# Patient Record
Sex: FEMALE | Race: WHITE | Employment: OTHER | URBAN - METROPOLITAN AREA
[De-identification: names, ages, dates, MRNs, and addresses within clinical notes are randomized per-mention and may not be internally consistent; named-entity substitution may affect disease eponyms.]

---

## 2017-09-26 ENCOUNTER — HOSPITAL ENCOUNTER (OUTPATIENT)
Dept: LAB | Age: 67
Discharge: HOME | End: 2017-09-26
Payer: MEDICARE

## 2017-09-26 ENCOUNTER — OFFICE VISIT (OUTPATIENT)
Dept: GYNECOLOGY | Age: 67
End: 2017-09-26

## 2017-09-26 VITALS
SYSTOLIC BLOOD PRESSURE: 151 MMHG | HEART RATE: 72 BPM | WEIGHT: 293 LBS | DIASTOLIC BLOOD PRESSURE: 85 MMHG | BODY MASS INDEX: 41.95 KG/M2 | HEIGHT: 70 IN

## 2017-09-26 DIAGNOSIS — Z85.42 HISTORY OF ENDOMETRIAL CANCER: Primary | ICD-10-CM

## 2017-09-26 DIAGNOSIS — Z91.89 GYN EXAM FOR HIGH-RISK MEDICARE PATIENT: ICD-10-CM

## 2017-09-26 PROCEDURE — 88142 CYTOPATH C/V THIN LAYER: CPT | Performed by: OBSTETRICS & GYNECOLOGY

## 2017-09-26 RX ORDER — ALLOPURINOL 100 MG/1
100 TABLET ORAL
COMMUNITY

## 2017-09-26 NOTE — PROGRESS NOTES
One year check up, pt reports no abnormal spotting or bleeding, pt states she has no questions or concerns for today's visit, Patient states she is no longer taking the following medications: Premarin, Plavix, HCTZ and Protonix, Initial blood pressure reading 158/94, repeat blood pressure reading 151/85

## 2017-09-26 NOTE — PROGRESS NOTES
[facility] [address], [address]  [phone]  MD Brittanie Gamble MD    Patient ID:  Michelle Magallanes  [id number]  1950/66 y.o. Visit date: 9/26/2017    INTERVAL HISTORY: Michelle Magallanes is a  female with a history of endometrial cancer. Last cytology 8/2015, 7/2014   Negative    CT: 4/2012   FINDINGS:    ABDOMEN: The lung bases show right middle lobe infiltrate. . Liver and spleen   parenchyma are homogeneous. The pancreas and adrenal glands are normal. The   kidneys excrete contrast symmetrically bilaterally. A single nonobstructing   calculus appears in the left upper renal pole. Bowel loops are nondilated   and there is no ascites. PELVIS: Additional evaluation of the pelvis reveals no abnormal mass or   fluid collection. The uterus is surgically absent, with no abnormal soft   tissue mass in the hysterectomy bed. Surgical clips also noted in the   adnexal regions bilaterally. The urinary bladder is normally distended. The   distal ureters are normal. The appendix is normal.      IMPRESSION:  1. Right middle lobe infiltrate. 2. No mechanical obstruction of bowel. 3. Single nonobstructing left intrarenal calculus. 4. Normal appendix. 5. Status post hysterectomy. Imaging history: Mammogram current, followup scheduled for a suspicious reading. Chemotherapy history: None  Last Cytology: 8/2016   Negative    9/26/2017:  She is being seen today for annual followup. Asymptomatic, active, no restrictions. Active, no restrictions. Negative  and GI review. Negative cardiopulmonary review. Patient denies any abnormal bleeding or vaginal discharge. Weight stable. Recent Cardiology evaluation, cath negative.     OB/GYN ROS: Denies, dysuria, hematuria, urinary incontinence, vaginal discharge, abnormal vaginal bleeding, pelvic pain    Past Medical History:   Diagnosis Date    [facility] [address]     [facility]    right breast, no evidence of malignancy    Hypertension     Liver disease     hepatitis    Liver disease     fatty liver       Past Surgical History:   Procedure Laterality Date    HX COLONOSCOPY      HX ORTHOPAEDIC      left knee    HX ORTHOPAEDIC      right shoulder    HX OTHER SURGICAL      kiney stone removal       Social History     Social History    Marital status:      Spouse name: N/A    Number of children: N/A    Years of education: N/A     Occupational History    Not on file. Social History Main Topics    Smoking status: Never Smoker    Smokeless tobacco: Never Used    Alcohol use No    Drug use: No    Sexual activity: Not Currently     Other Topics Concern    Not on file     Social History Narrative       Family History   Problem Relation Age of Onset    Hypertension Other        Current Outpatient Prescriptions on File Prior to Visit   Medication Sig Dispense Refill    isosorbide mononitrate ER (IMDUR) 30 mg tablet TK 1 T PO QAM  4    esomeprazole (NEXIUM) 20 mg capsule Take  by mouth daily.  metoprolol succinate (TOPROL XL) 100 mg XL tablet Take 150 mg by mouth daily.  benazepril (LOTENSIN) 40 mg tablet Take 40 mg by mouth daily.  aspirin 81 mg tablet Take 81 mg by mouth daily.  clopidogrel (PLAVIX) 75 mg tablet TK 1 T PO  D  3    NITROSTAT 0.4 mg SL tablet   5    pantoprazole (PROTONIX) 40 mg tablet       estrogens, conjugated, (PREMARIN) 0.45 mg tablet Take 1 Tab by mouth daily. 90 Tab 3    hydrochlorothiazide (HYDRODIURIL) 25 mg tablet Take 25 mg by mouth daily. Indications: HYPERTENSION       No current facility-administered medications on file prior to visit.         Allergies   Allergen Reactions    Pcn [Penicillins] Rash       ROS:  Negative    OBJECTIVE:  PHYSICAL EXAM  VITAL SIGNS: Visit Vitals    /85 (BP 1 Location: Left arm, BP Patient Position: Sitting)    Pulse 72    Ht 5' 10\" (1.778 m)    Wt 297 lb (134.7 kg)    BMI 42.62 kg/m2      GENERAL CHANDRA: well developed and well nourished   HEENT: within normal limits, node negative. No thyroid nodularity. RESPIRATORY: lungs clear to auscultation, breath sounds equal and symmetric   CARDIOVASC: Regular rate and rhythm or S1S2 present   GASTROINT: soft, non-tender, without masses or organomegaly   MUSCULOSKEL: no joint tenderness, deformity or swelling   INTEGUMENT:    EXTREMITIES: extremities normal, atraumatic, no cyanosis or edema   PELVIC: External genitalia: normal general appearance, BUS negative  Vaginal: normal without tenderness, induration or masses and normal rugae       Cytology taken  Adnexa: normal bimanual exam, non palpable and removed surgically. PSW clear. Smooth induration over the pelvis. RECTAL: rectal exam not indicated   DERIC SURVEY: Cervical, supraclavicular, and axillary nodes normal.   NEURO: Grossly normal     DATE REVIEW as available:  Lab Results   Component Value Date/Time    WBC 11.3 04/11/2012 11:55 PM    HGB 13.4 04/11/2012 11:55 PM    HCT 39.6 04/11/2012 11:55 PM    PLATELET 093 87/32/0779 11:55 PM    MCV 91.0 04/11/2012 11:55 PM     Lab Results   Component Value Date/Time    Sodium 137 04/11/2012 11:55 PM    Potassium 2.8 04/11/2012 11:55 PM    Chloride 99 04/11/2012 11:55 PM    CO2 31 04/11/2012 11:55 PM    Anion gap 7 04/11/2012 11:55 PM    Glucose 141 04/11/2012 11:55 PM    BUN 15 04/11/2012 11:55 PM    Creatinine 0.9 04/11/2012 11:55 PM    BUN/Creatinine ratio 17 04/11/2012 11:55 PM    GFR est AA >60 04/11/2012 11:55 PM    GFR est non-AA >60 04/11/2012 11:55 PM    Calcium 9.6 04/11/2012 11:55 PM       IMPRESSION AND PLAN:    Debra Severe has a working diagnosis of endometrial cancer, DEEPALI  Cytology taken without friability    Return annually or PRN symptoms, pending normal cytology.       Russ Hernandez MD  9/26/2017/2:35 PM

## 2017-10-04 NOTE — PROGRESS NOTES
Patient:   Murray Boyle  SSN: [id number]  : 1950    Date:    10/4/2017    Ms. Dutch Rome's cytology/Pap smear has been interpreted as within normal limts. I would ask that subsequent Pap smears be performed at the interval discussed at the last office visit.     If there are any questions please do not hesitate to contact our offices ([phone])    Edna Gracia MD

## 2018-12-31 ENCOUNTER — TELEPHONE (OUTPATIENT)
Dept: GYNECOLOGY | Age: 68
End: 2018-12-31

## 2018-12-31 DIAGNOSIS — Z12.31 ENCOUNTER FOR SCREENING MAMMOGRAM FOR MALIGNANT NEOPLASM OF BREAST: Primary | ICD-10-CM

## 2018-12-31 NOTE — TELEPHONE ENCOUNTER
Pt  and states she has an appt with Dr. Mahnaz Thornton on 1/16/19 and needs an order for her yearly screening mammogram.  She states she typically get them done at [facility].   Pt would like order mailed to her address, address on file verified, order placed in mail today

## 2019-01-16 ENCOUNTER — OFFICE VISIT (OUTPATIENT)
Dept: GYNECOLOGY | Age: 69
End: 2019-01-16

## 2019-01-16 VITALS
SYSTOLIC BLOOD PRESSURE: 140 MMHG | BODY MASS INDEX: 41.95 KG/M2 | WEIGHT: 293 LBS | HEIGHT: 70 IN | DIASTOLIC BLOOD PRESSURE: 70 MMHG | HEART RATE: 75 BPM

## 2019-01-16 DIAGNOSIS — E78.00 HYPERCHOLESTEREMIA: ICD-10-CM

## 2019-01-16 DIAGNOSIS — C54.1 ENDOMETRIAL CANCER (HCC): Primary | ICD-10-CM

## 2019-01-16 DIAGNOSIS — E66.01 OBESITY, MORBID (HCC): ICD-10-CM

## 2019-01-16 RX ORDER — HYDRALAZINE HYDROCHLORIDE 25 MG/1
25 TABLET, FILM COATED ORAL 3 TIMES DAILY
COMMUNITY
Start: 2019-01-09

## 2019-01-16 NOTE — PROGRESS NOTES
[address], [address] [facility], [address] 
[phone] Office Note Patient ID: 
Ventura Phalen [id number] 
1950/68 y.o. Visit date: 1/16/2019 INTERVAL HISTORY: Ventura Phalen Ms. Ventura Phalen is a 70 y.o.  female with a remote history of endometrial cancer s/p staging laparotomy in 1993. Denies all complaints today. Denies vaginal bleeding/discharge, change in appetite or bowel habits, change in weight, abdominal/pelvic pain, or urinary symptoms. OB/GYN ROS: Denies, dysuria, hematuria, urinary incontinence, vaginal discharge, abnormal vaginal bleeding, pelvic pain Past Medical History:  
Diagnosis Date  Cancer ([facility] [address]   
 utriene  H/O diagnostic mammography 3/4/16  
 right breast, no evidence of malignancy  Hypertension  Liver disease   
 hepatitis  Liver disease   
 fatty liver Past Surgical History:  
Procedure Laterality Date  HX COLONOSCOPY    
 HX ORTHOPAEDIC    
 left knee  HX ORTHOPAEDIC    
 right shoulder  HX OTHER SURGICAL    
 kiney stone removal  
 
 
Social History Socioeconomic History  Marital status:  Spouse name: Not on file  Number of children: Not on file  Years of education: Not on file  Highest education level: Not on file Social Needs  Financial resource strain: Not on file  Food insecurity - worry: Not on file  Food insecurity - inability: Not on file  Transportation needs - medical: Not on file  Transportation needs - non-medical: Not on file Occupational History  Not on file Tobacco Use  Smoking status: Never Smoker  Smokeless tobacco: Never Used Substance and Sexual Activity  Alcohol use: No  
 Drug use: No  
 Sexual activity: Not Currently Other Topics Concern  Not on file Social History Narrative  Not on file Family History Problem Relation Age of Onset  Hypertension Other Current Outpatient Medications on File Prior to Visit Medication Sig Dispense Refill  hydrALAZINE (APRESOLINE) 25 mg tablet  cholecalciferol, vitamin D3, (VITAMIN D3 PO) Take  by mouth.  allopurinol (ZYLOPRIM) 100 mg tablet Take 100 mg by mouth.  isosorbide mononitrate ER (IMDUR) 30 mg tablet TK 1 T PO QAM  4  
 metoprolol succinate (TOPROL XL) 100 mg XL tablet Take 150 mg by mouth daily.  benazepril (LOTENSIN) 40 mg tablet Take 40 mg by mouth daily.  aspirin 81 mg tablet Take 81 mg by mouth daily.  clopidogrel (PLAVIX) 75 mg tablet TK 1 T PO  D  3  
 NITROSTAT 0.4 mg SL tablet   5  pantoprazole (PROTONIX) 40 mg tablet  esomeprazole (NEXIUM) 20 mg capsule Take  by mouth daily.  estrogens, conjugated, (PREMARIN) 0.45 mg tablet Take 1 Tab by mouth daily. 90 Tab 3  
 hydrochlorothiazide (HYDRODIURIL) 25 mg tablet Take 25 mg by mouth daily. Indications: HYPERTENSION No current facility-administered medications on file prior to visit. Allergies Allergen Reactions  Pcn [Penicillins] Rash ROS:  
Review of Systems - History obtained from the patient General ROS: negative for - chills, fatigue, fever, weight gain or weight loss Psychological ROS: negative for - anxiety or depression ENT ROS: negative for - headaches, sore throat or visual changes Breast ROS: negative for breast lumps Respiratory ROS: no cough, shortness of breath, or wheezing Cardiovascular ROS: no chest pain or dyspnea on exertion Gastrointestinal ROS: no abdominal pain, change in bowel habits, or black or bloody stools Genito-Urinary ROS: no dysuria, trouble voiding, or hematuria Musculoskeletal ROS: negative for - gait disturbance, joint pain, joint stiffness, muscle pain or muscular weakness Neurological ROS: negative for - confusion, dizziness, headaches, memory loss, numbness/tingling, seizures, speech problems or weakness Dermatological ROS: negative for - pruritus, rash or skin lesion changes OBJECTIVE: 
Physical Exam: 
Visit Vitals /70 (BP 1 Location: Left arm, BP Patient Position: Sitting) Pulse 75 Ht 5' 10\" (1.778 m) Wt 299 lb 9.6 oz (135.9 kg) BMI 42.99 kg/m² General: Alert and oriented. No acute distress. Well-nourished HEENT: No thyroid enlargment. Neck supple without restrictions. Sclera normal. Normal occular motion. Moist mucous membranes. Lymphatics: No evidence of axillary, cervical, or subclavicular adenopathy. Respiratory: clear to auscultation and percussion to the bases. No CVAT. Cardiovascular: regular rate and rhythm. No murmurs, rubs, or gallops. Gastrointestinal: soft, non-tender, non-distended, no masses or organomegaly. Well-healed incision. Musculoskeletal: normal gait. No joint tenderness, deformity or swelling. No muscular tenderness. Extremities: extremities normal, atraumatic, no cyanosis or edema. Pelvic: exam chaperoned by nurse. Normal appearing external genitalia. On speculum exam, the vagina is atrophic. The uterus and cervix are surgically absent. No evidence of masses or nodularity on bimanual exam. Deferred rectovaginal exam.  
Neuro: Grossly intact. Normal gait and movement. No acute deficit Skin: No evidence of rashes or skin changes. IMPRESSION AND PLAN: 
Ms. Ventura Phalen is a 70 y.o. female with a remote history of endometrial cancer s/p staging laparotomy in 1993. Problem List Items Addressed This Visit Reproductive Endometrial cancer ([facility] [address] - Primary Other Hypercholesteremia Obesity, morbid ([facility] [address] Reviewed patient's course to date. DEEPALI on exam today. Reassured patient. Annual mammogram scheduled the end of this month. Reviewed precautionary symptoms to return sooner. All questions and concerns were addressed with the patient and she is comfortable with the plan.   
 
Lisa Kelley MD

## 2019-01-16 NOTE — PROGRESS NOTES
One year check up, pt reports no abnormal spotting or bleeding, pt states she has no questions or concerns for today's visit 1. Have you been to the ER, urgent care clinic since your last visit? Hospitalized since your last visit?  no 
 
2. Have you seen or consulted any other health care providers outside of the [address] [place] [address] since your last visit? Include any pap smears or colon screening.    no

## 2019-08-12 ENCOUNTER — ANESTHESIA EVENT (OUTPATIENT)
Dept: ENDOSCOPY | Age: 69
End: 2019-08-12
Payer: MEDICARE

## 2019-08-12 ENCOUNTER — ANESTHESIA (OUTPATIENT)
Dept: ENDOSCOPY | Age: 69
End: 2019-08-12
Payer: MEDICARE

## 2019-08-12 ENCOUNTER — HOSPITAL ENCOUNTER (OUTPATIENT)
Age: 69
Setting detail: OUTPATIENT SURGERY
Discharge: HOME | End: 2019-08-12
Attending: COLON & RECTAL SURGERY | Admitting: COLON & RECTAL SURGERY
Payer: MEDICARE

## 2019-08-12 VITALS
RESPIRATION RATE: 20 BRPM | OXYGEN SATURATION: 97 % | WEIGHT: 293 LBS | BODY MASS INDEX: 41.02 KG/M2 | TEMPERATURE: 97.9 F | DIASTOLIC BLOOD PRESSURE: 72 MMHG | HEART RATE: 65 BPM | SYSTOLIC BLOOD PRESSURE: 130 MMHG | HEIGHT: 71 IN

## 2019-08-12 PROCEDURE — 76060000031 HC ANESTHESIA FIRST 0.5 HR: Performed by: COLON & RECTAL SURGERY

## 2019-08-12 PROCEDURE — 76040000019: Performed by: COLON & RECTAL SURGERY

## 2019-08-12 PROCEDURE — 74011250636 HC RX REV CODE- 250/636: Performed by: NURSE ANESTHETIST, CERTIFIED REGISTERED

## 2019-08-12 RX ORDER — NALOXONE HYDROCHLORIDE 0.4 MG/ML
0.4 INJECTION, SOLUTION INTRAMUSCULAR; INTRAVENOUS; SUBCUTANEOUS
Status: DISCONTINUED | OUTPATIENT
Start: 2019-08-12 | End: 2019-08-12 | Stop reason: HOSPADM

## 2019-08-12 RX ORDER — SODIUM CHLORIDE 0.9 % (FLUSH) 0.9 %
5-40 SYRINGE (ML) INJECTION AS NEEDED
Status: DISCONTINUED | OUTPATIENT
Start: 2019-08-12 | End: 2019-08-12 | Stop reason: HOSPADM

## 2019-08-12 RX ORDER — PROPOFOL 10 MG/ML
INJECTION, EMULSION INTRAVENOUS
Status: DISCONTINUED | OUTPATIENT
Start: 2019-08-12 | End: 2019-08-12 | Stop reason: HOSPADM

## 2019-08-12 RX ORDER — DEXTROMETHORPHAN/PSEUDOEPHED 2.5-7.5/.8
1.2 DROPS ORAL
Status: DISCONTINUED | OUTPATIENT
Start: 2019-08-12 | End: 2019-08-12 | Stop reason: HOSPADM

## 2019-08-12 RX ORDER — FLUMAZENIL 0.1 MG/ML
0.2 INJECTION INTRAVENOUS
Status: DISCONTINUED | OUTPATIENT
Start: 2019-08-12 | End: 2019-08-12 | Stop reason: HOSPADM

## 2019-08-12 RX ORDER — ATROPINE SULFATE 0.1 MG/ML
0.5 INJECTION INTRAVENOUS
Status: DISCONTINUED | OUTPATIENT
Start: 2019-08-12 | End: 2019-08-12 | Stop reason: HOSPADM

## 2019-08-12 RX ORDER — PROPOFOL 10 MG/ML
INJECTION, EMULSION INTRAVENOUS AS NEEDED
Status: DISCONTINUED | OUTPATIENT
Start: 2019-08-12 | End: 2019-08-12 | Stop reason: HOSPADM

## 2019-08-12 RX ORDER — SODIUM CHLORIDE 0.9 % (FLUSH) 0.9 %
5-40 SYRINGE (ML) INJECTION EVERY 8 HOURS
Status: DISCONTINUED | OUTPATIENT
Start: 2019-08-12 | End: 2019-08-12 | Stop reason: HOSPADM

## 2019-08-12 RX ADMIN — PROPOFOL 30 MG: 10 INJECTION, EMULSION INTRAVENOUS at 09:55

## 2019-08-12 RX ADMIN — PROPOFOL 100 MCG/KG/MIN: 10 INJECTION, EMULSION INTRAVENOUS at 09:54

## 2019-08-12 RX ADMIN — PROPOFOL 50 MG: 10 INJECTION, EMULSION INTRAVENOUS at 09:54

## 2019-08-12 RX ADMIN — PROPOFOL 20 MG: 10 INJECTION, EMULSION INTRAVENOUS at 09:56

## 2019-08-12 NOTE — ANESTHESIA POSTPROCEDURE EVALUATION
Procedure(s):  COLONOSCOPY. MAC    Anesthesia Post Evaluation      Multimodal analgesia: multimodal analgesia used between 6 hours prior to anesthesia start to PACU discharge  Patient location during evaluation: bedside  Patient participation: complete - patient participated  Level of consciousness: awake  Pain management: adequate  Airway patency: patent  Anesthetic complications: no  Cardiovascular status: acceptable  Respiratory status: acceptable  Hydration status: acceptable        Vitals Value Taken Time   /72 8/12/2019 10:29 AM   Temp 36.6 °C (97.9 °F) 8/12/2019 10:18 AM   Pulse 65 8/12/2019 10:34 AM   Resp 21 8/12/2019 10:34 AM   SpO2 96 % 8/12/2019 10:34 AM   Vitals shown include unvalidated device data.

## 2019-08-12 NOTE — DISCHARGE INSTRUCTIONS
Abram Brown  [id number]  1950    COLON DISCHARGE INSTRUCTIONS  Discomfort:  Redness at IV site- apply warm compress to area; if redness or soreness persist- contact your physician  There may be a slight amount of blood passed from the rectum  Gaseous discomfort- walking, belching will help relieve any discomfort  You may not operate a vehicle for 12 hours  You may not engage in an occupation involving machinery or appliances for rest of today  You may not drink alcoholic beverages for at least 12 hours  Avoid making any critical decisions for at least 24 hour  DIET:   High fiber diet. - however -  remember your colon is empty and a heavy meal will produce gas. Avoid these foods:  vegetables, fried / greasy foods, carbonated drinks for today    MEDICATIONS:           ACTIVITY:  You may resume your normal daily activities it is recommended that you spend the remainder of the day resting -  avoid any strenuous activity. CALL M.D. ANY SIGN OF:   Increasing pain, nausea, vomiting  Abdominal distension (swelling)  New increased bleeding (oral or rectal)  Fever (chills)  Pain in chest area  Bloody discharge from nose or mouth  Shortness of breath     Follow-up Instructions:   Call Ishmael Luna MD if any questions or problems. Telephone # [phone]  Should have a repeat colonoscopy in 5 years. COLONOSCOPY FINDINGS:  Your colonoscopy showed: diverticulosis.

## 2019-08-12 NOTE — PROCEDURES
[facility]   [facility] Brief Procedure Note    Deangelo Collier  [birth year]  [id number]    Date of Procedure: 8/12/2019    Preoperative diagnosis: SCREENING    Postoperative diagnosis: Diverticulosis.      Procedure: Procedure(s):  COLONOSCOPY    :  Dr. Arcelia Rios MD    Assistant(s): Endoscopy Technician-1: Princess Kaba  Endoscopy RN-1: Kobi Tinsley RN    EBL:None    Anesthesia/Sedation: Moderate sedation / MAC    Specimens: * No specimens in log *    Findings: diverticulosis    Complications: None    Dr. Arcelia Rios MD  8/12/2019  10:12 AM

## 2019-08-12 NOTE — PERIOP NOTES
[id number]  Anesthesia staff at patient's bedside administering anesthesia and monitoring patients vital signs throughout procedure. See anesthesia note. 1010  Endoscope was pre-cleaned at bedside immediately following procedure by Chadd Cuevas.    1012  Patient tolerated procedure without problems. Abdomen soft and patient arousable and voices no complaints Report received from CRNA, see anesthesia note. Patient transported to endoscopy recovery area. Report given to Felix Elizabeth RN.

## 2019-08-12 NOTE — ROUTINE PROCESS
Raisa Mederos 1950 
[id number] Situation: 
Verbal report received from: [facility] Procedure: Procedure(s): 
COLONOSCOPY Background: 
 
Preoperative diagnosis: SCREENING Postoperative diagnosis: Diverticulosis. :  Dr. Valerie Lazo Assistant(s): Endoscopy Technician-1: Adore Ashraf 
Endoscopy RN-1: Nathalia Coe RN Specimens: * No specimens in log * H. Pylori  no Assessment: 
Intra-procedure medications Anesthesia gave intra-procedure sedation and medications, see anesthesia flow sheet yes Intravenous fluids: NS@ Mykel Sinha Vital signs stable Abdominal assessment: round and soft Recommendation: 
Discharge patient per MD order. Family or Friend Permission to share finding with family or friend yes

## 2019-08-12 NOTE — H&P
Gastroenterology Outpatient History and Physical    Patient: Carol Sidhu    Physician: Genesis Lanier MD    Vital Signs: Blood pressure 156/84, pulse 82, temperature 97.8 °F (36.6 °C), resp. rate 21, height 5' 10.5\" (1.791 m), weight 136.8 kg (301 lb 9.4 oz), SpO2 100 %, not currently breastfeeding. Allergies: Allergies   Allergen Reactions    Pcn [Penicillins] Rash       Chief Complaint: screening colonoscopy    History of Present Illness: as above    Justification for Procedure: as above    History:  Past Medical History:   Diagnosis Date    Cancer ([facility] [address] 1993    uterine, complete hysterectomy, no radiation or chemo    Gout     H/O diagnostic mammography 3/4/16    right breast, no evidence of malignancy    Hypertension     Liver disease 1961    hepatitis,     Liver disease     fatty liver      Past Surgical History:   Procedure Laterality Date    HX COLONOSCOPY      HX ORTHOPAEDIC Left 2009    left knee replacement    HX ORTHOPAEDIC Right 2005    right shoulder partial replacement    HX OTHER SURGICAL      kiney stone removal      Social History     Socioeconomic History    Marital status:      Spouse name: Not on file    Number of children: Not on file    Years of education: Not on file    Highest education level: Not on file   Tobacco Use    Smoking status: Never Smoker    Smokeless tobacco: Never Used   Substance and Sexual Activity    Alcohol use: No    Drug use: No    Sexual activity: Not Currently      Family History   Problem Relation Age of Onset    Hypertension Other        Medications:   Prior to Admission medications    Medication Sig Start Date End Date Taking? Authorizing Provider   hydrALAZINE (APRESOLINE) 25 mg tablet 25 mg three (3) times daily. Indications: high blood pressure 1/9/19  Yes Provider, Historical   metoprolol succinate (TOPROL XL) 100 mg XL tablet Take 150 mg by mouth daily.    Yes Provider, Historical   benazepril (LOTENSIN) 40 mg tablet Take 40 mg by mouth daily. Yes Other, MD Demetrius   cholecalciferol, vitamin D3, (VITAMIN D3 PO) Take  by mouth. Provider, Historical   allopurinol (ZYLOPRIM) 100 mg tablet Take 100 mg by mouth. Provider, Historical   NITROSTAT 0.4 mg SL tablet 0.4 mg by SubLINGual route as needed. 5/25/16   Provider, Historical   aspirin 81 mg tablet Take 81 mg by mouth daily. Other, MD Demetrius       Physical Exam:   General: alert, no distress   HEENT: Head: Normocephalic, no lesions, without obvious abnormality.    Heart: regular rate and rhythm, S1, S2 normal, no murmur, click, rub or gallop   Lungs: chest clear, no wheezing, rales, normal symmetric air entry   Abdominal: Bowel sounds are normal, liver is not enlarged, spleen is not enlarged   Neurological: Grossly normal   Extremities: extremities normal, atraumatic, no cyanosis or edema     Findings/Diagnosis: a above    Plan of Care/Planned Procedure: colonoscopy

## 2019-08-12 NOTE — ANESTHESIA PREPROCEDURE EVALUATION
Relevant Problems   No relevant active problems       Anesthetic History   No history of anesthetic complications            Review of Systems / Medical History  Patient summary reviewed and pertinent labs reviewed    Pulmonary  Within defined limits                 Neuro/Psych   Within defined limits           Cardiovascular    Hypertension: well controlled              Exercise tolerance: >4 METS     GI/Hepatic/Renal             Pertinent negatives: Liver disease: fatty liver.    Endo/Other        Morbid obesity and cancer (h/o uterine cancer)     Other Findings   Comments: Gout           Physical Exam    Airway  Mallampati: II  TM Distance: 4 - 6 cm  Neck ROM: normal range of motion   Mouth opening: Normal     Cardiovascular    Rhythm: regular  Rate: normal         Dental    Dentition: Upper dentition intact, Lower dentition intact and Caps/crowns     Pulmonary  Breath sounds clear to auscultation               Abdominal         Other Findings            Anesthetic Plan    ASA: 2  Anesthesia type: MAC          Induction: Intravenous  Anesthetic plan and risks discussed with: Patient

## 2019-08-13 NOTE — PROCEDURES
Gael Cordero [facility]  PROCEDURE NOTE    Name:  Angela Snider  MR#:  [id number]  :  1950  ACCOUNT #:  [de-identified]  DATE OF SERVICE:  2019    PREOPERATIVE DIAGNOSIS:  Screening, history of uterine cancer. POSTOPERATIVE DIAGNOSIS:  Diverticulosis. PROCEDURE PERFORMED:  Colonoscopy. SURGEON:  Felix Ramirez MD    ASSISTANT:  None. ANESTHESIA:  MAC.    ESTIMATED BLOOD LOSS:  None. SPECIMENS REMOVED:  None. COMPLICATIONS:  None. IMPLANTS:  None. INDICATIONS:  The patient is 70years old. She has a history of uterine cancer and comes for screening colonoscopy. PROCEDURE:  After mechanical bowel prep, after intravenous sedation with 290 mg of propofol, the Olympus scope was introduced into the rectum and advanced under direct visualization to the cecum. The ileocecal valve and cecal cap were well visualized. Good bowel prep. The scope was slowly withdrawn. The mucosa was inspected. No polyps or neoplasia were seen. She did have moderate diverticulosis at the sigmoid colon without complication. Rectum is normal.  Air was aspirated. The patient tolerated the procedure well. IMPRESSION:  Diverticulosis, otherwise normal.    PLAN:  Recommend colonoscopy in five years.       Lois Abbott MD      [id number]  D:  2019 10:27  T:  2019 16:49  JOB #:  [id number]  CC:  MD Adriane Siddiqui MD

## 2020-02-26 ENCOUNTER — OFFICE VISIT (OUTPATIENT)
Dept: GYNECOLOGY | Age: 70
End: 2020-02-26

## 2020-02-26 VITALS
SYSTOLIC BLOOD PRESSURE: 155 MMHG | HEART RATE: 69 BPM | WEIGHT: 293 LBS | DIASTOLIC BLOOD PRESSURE: 85 MMHG | HEIGHT: 71 IN | BODY MASS INDEX: 41.02 KG/M2

## 2020-02-26 DIAGNOSIS — C54.1 ENDOMETRIAL CANCER (HCC): Primary | ICD-10-CM

## 2020-02-26 DIAGNOSIS — E66.01 OBESITY, MORBID (HCC): ICD-10-CM

## 2020-02-26 RX ORDER — ISOSORBIDE MONONITRATE 30 MG/1
TABLET, EXTENDED RELEASE ORAL
COMMUNITY
Start: 2020-01-18

## 2020-02-26 NOTE — PROGRESS NOTES
[address] [facility] [address], [address] [facility]  [phone] [facility] [phone]    Office Note    Patient ID:  Linda Castañeda  [id number]  1950/69 y.o. Visit date: 2/26/2020    INTERVAL HISTORY: Linda Castañeda is a 78 y.o.  female with a remote history of endometrial cancer s/p staging laparotomy in 1993. Denies all complaints today. Denies vaginal bleeding/discharge, change in appetite or bowel habits, change in weight, abdominal/pelvic pain, or urinary symptoms.        OB/GYN ROS: Denies, dysuria, hematuria, urinary incontinence, vaginal discharge, abnormal vaginal bleeding, pelvic pain    Past Medical History:   Diagnosis Date    Cancer [facility]) 1993    uterine, complete hysterectomy, no radiation or chemo    Gout     H/O diagnostic mammography 3/4/16    right breast, no evidence of malignancy    Hypertension     Liver disease 1961    hepatitis,     Liver disease     fatty liver       Past Surgical History:   Procedure Laterality Date    COLONOSCOPY N/A 8/12/2019    COLONOSCOPY performed by Ilana Meehan MD at [facility] HX COLONOSCOPY      HX ORTHOPAEDIC Left 2009    left knee replacement    HX ORTHOPAEDIC Right 2005    right shoulder partial replacement    HX OTHER SURGICAL      kiney stone removal       Social History     Socioeconomic History    Marital status:      Spouse name: Not on file    Number of children: Not on file    Years of education: Not on file    Highest education level: Not on file   Occupational History    Not on file   Social Needs    Financial resource strain: Not on file    Food insecurity:     Worry: Not on file     Inability: Not on file    Transportation needs:     Medical: Not on file     Non-medical: Not on file   Tobacco Use    Smoking status: Never Smoker    Smokeless tobacco: Never Used   Substance and Sexual Activity    Alcohol use: No    Drug use: No    Sexual activity: Not Currently   Lifestyle    Physical activity:     Days per week: Not on file     Minutes per session: Not on file    Stress: Not on file   Relationships    Social connections:     Talks on phone: Not on file     Gets together: Not on file     Attends Protestant service: Not on file     Active member of club or organization: Not on file     Attends meetings of clubs or organizations: Not on file     Relationship status: Not on file    Intimate partner violence:     Fear of current or ex partner: Not on file     Emotionally abused: Not on file     Physically abused: Not on file     Forced sexual activity: Not on file   Other Topics Concern    Not on file   Social History Narrative    Not on file       Family History   Problem Relation Age of Onset    Hypertension Other        Current Outpatient Medications on File Prior to Visit   Medication Sig Dispense Refill    hydrALAZINE (APRESOLINE) 25 mg tablet 25 mg three (3) times daily. Indications: high blood pressure      cholecalciferol, vitamin D3, (VITAMIN D3 PO) Take  by mouth.  allopurinol (ZYLOPRIM) 100 mg tablet Take 100 mg by mouth.  metoprolol succinate (TOPROL XL) 100 mg XL tablet Take 150 mg by mouth daily.  benazepril (LOTENSIN) 40 mg tablet Take 40 mg by mouth daily.  aspirin 81 mg tablet Take 81 mg by mouth daily.  isosorbide mononitrate ER (IMDUR) 30 mg tablet       NITROSTAT 0.4 mg SL tablet 0.4 mg by SubLINGual route as needed. 5     No current facility-administered medications on file prior to visit.         Allergies   Allergen Reactions    Pcn [Penicillins] Rash       ROS:   Review of Systems - History obtained from the patient  General ROS: negative for - chills, fatigue, fever, weight gain or weight loss  Psychological ROS: negative for - anxiety or depression  ENT ROS: negative for - headaches, sore throat or visual changes  Breast ROS: negative for breast lumps  Respiratory ROS: no cough, shortness of breath, or wheezing  Cardiovascular ROS: no chest pain or dyspnea on exertion  Gastrointestinal ROS: no abdominal pain, change in bowel habits, or black or bloody stools  Genito-Urinary ROS: no dysuria, trouble voiding, or hematuria  Musculoskeletal ROS: negative for - gait disturbance, joint pain, joint stiffness, muscle pain or muscular weakness  Neurological ROS: negative for - confusion, dizziness, headaches, memory loss, numbness/tingling, seizures, speech problems or weakness  Dermatological ROS: negative for - pruritus, rash or skin lesion changes      OBJECTIVE:  Physical Exam:  Visit Vitals  /85 (BP 1 Location: Left arm, BP Patient Position: Sitting)   Pulse 69   Ht 5' 10.5\" (1.791 m)   Wt 305 lb 9.6 oz (138.6 kg)   BMI 43.23 kg/m²      General: Alert and oriented. No acute distress. Well-nourished  HEENT: No thyroid enlargment. Neck supple without restrictions. Sclera normal. Normal occular motion. Moist mucous membranes. Lymphatics: No evidence of axillary, cervical, or subclavicular adenopathy. Respiratory: clear to auscultation and percussion to the bases. No CVAT. Cardiovascular: regular rate and rhythm. No murmurs, rubs, or gallops. Gastrointestinal: soft, non-tender, non-distended, no masses or organomegaly. Well-healed incision. Musculoskeletal: normal gait. No joint tenderness, deformity or swelling. No muscular tenderness. Extremities: extremities normal, atraumatic, no cyanosis or edema. Pelvic: exam chaperoned by nurse. Normal appearing external genitalia. On speculum exam, the vagina is atrophic. The uterus and cervix are surgically absent. No evidence of masses or nodularity on bimanual exam. Deferred rectovaginal exam.   Neuro: Grossly intact. Normal gait and movement. No acute deficit  Skin: No evidence of rashes or skin changes.         IMPRESSION AND PLAN:  Ms. Wally Chao is a 78 y.o. female with a remote history of endometrial cancer s/p staging laparotomy in 1993.     Problem List Items Addressed This Visit        Reproductive    Endometrial cancer ([facility] [address] - Primary       Other    Obesity, morbid ([facility] [address]        Reviewed patient's course to date. DEEPALI on exam today. Reassured patient. Annual mammogram scheduled in 2 weeks. RTC in 1 year for continued surveillance. Reviewed precautionary symptoms to return sooner. All questions and concerns were addressed with the patient and she is comfortable with the plan.      Narcisa Pabon MD

## 2020-02-26 NOTE — PROGRESS NOTES
One year follow up for endometrial cancer, pt reports no abnormal spotting or bleeding, pt states she has no questions or concerns for today's visit    Vitals:    02/26/20 1030 02/26/20 1034   BP: (!) 171/94 155/85   BP 1 Location: Left arm Left arm   BP Patient Position: Sitting Sitting   Pulse: 82 69   Weight: 305 lb 9.6 oz (138.6 kg)    Height: 5' 10.5\" (1.791 m)            1. Have you been to the ER, urgent care clinic since your last visit? Hospitalized since your last visit?  no    2. Have you seen or consulted any other health care providers outside of the [address] [place] [address] since your last visit? Include any pap smears or colon screening.    no

## 2021-05-05 ENCOUNTER — OFFICE VISIT (OUTPATIENT)
Dept: GYNECOLOGY | Age: 71
End: 2021-05-05
Payer: MEDICARE

## 2021-05-05 VITALS
HEIGHT: 71 IN | SYSTOLIC BLOOD PRESSURE: 161 MMHG | DIASTOLIC BLOOD PRESSURE: 101 MMHG | HEART RATE: 83 BPM | BODY MASS INDEX: 41.02 KG/M2 | WEIGHT: 293 LBS

## 2021-05-05 DIAGNOSIS — C54.1 ENDOMETRIAL CANCER (HCC): Primary | ICD-10-CM

## 2021-05-05 PROCEDURE — G8427 DOCREV CUR MEDS BY ELIG CLIN: HCPCS | Performed by: OBSTETRICS & GYNECOLOGY

## 2021-05-05 PROCEDURE — G8417 CALC BMI ABV UP PARAM F/U: HCPCS | Performed by: OBSTETRICS & GYNECOLOGY

## 2021-05-05 PROCEDURE — G8536 NO DOC ELDER MAL SCRN: HCPCS | Performed by: OBSTETRICS & GYNECOLOGY

## 2021-05-05 PROCEDURE — 3017F COLORECTAL CA SCREEN DOC REV: CPT | Performed by: OBSTETRICS & GYNECOLOGY

## 2021-05-05 PROCEDURE — G0463 HOSPITAL OUTPT CLINIC VISIT: HCPCS | Performed by: OBSTETRICS & GYNECOLOGY

## 2021-05-05 PROCEDURE — 1090F PRES/ABSN URINE INCON ASSESS: CPT | Performed by: OBSTETRICS & GYNECOLOGY

## 2021-05-05 PROCEDURE — 1101F PT FALLS ASSESS-DOCD LE1/YR: CPT | Performed by: OBSTETRICS & GYNECOLOGY

## 2021-05-05 PROCEDURE — G8400 PT W/DXA NO RESULTS DOC: HCPCS | Performed by: OBSTETRICS & GYNECOLOGY

## 2021-05-05 PROCEDURE — G8753 SYS BP > OR = 140: HCPCS | Performed by: OBSTETRICS & GYNECOLOGY

## 2021-05-05 PROCEDURE — G8755 DIAS BP > OR = 90: HCPCS | Performed by: OBSTETRICS & GYNECOLOGY

## 2021-05-05 PROCEDURE — G8432 DEP SCR NOT DOC, RNG: HCPCS | Performed by: OBSTETRICS & GYNECOLOGY

## 2021-05-05 PROCEDURE — 99213 OFFICE O/P EST LOW 20 MIN: CPT | Performed by: OBSTETRICS & GYNECOLOGY

## 2021-05-05 RX ORDER — VALSARTAN 320 MG/1
TABLET ORAL
COMMUNITY
Start: 2021-05-04

## 2021-05-05 NOTE — PROGRESS NOTES
One year follow up for endometrial cancer, pt reports no abnormal spotting or bleeding, pt states she has no questions or concerns for today's visit    1. Have you been to the ER, urgent care clinic since your last visit? Hospitalized since your last visit?  no    2. Have you seen or consulted any other health care providers outside of the [address] [place] [address] since your last visit? Include any pap smears or colon screening.    no

## 2021-05-05 NOTE — PROGRESS NOTES
27 [facility] [address], [address] [facility]  [phone] [facility] [phone]    Office Note    Patient ID:  Jet Trevino  [id number]  1950/70 y.o. Visit date: 5/5/2021    INTERVAL HISTORY: Jet Trevino is a 69 y.o.  female, an established patient,  with a remote history of endometrial cancer s/p staging laparotomy in 1993. Denies all complaints today. Denies vaginal bleeding/discharge, change in appetite or bowel habits, change in weight, abdominal/pelvic pain, or urinary symptoms. OB/GYN ROS: Denies, dysuria, hematuria, urinary incontinence, vaginal discharge, abnormal vaginal bleeding, pelvic pain    Past Medical History:   Diagnosis Date    Cancer [facility]) 1993    uterine, complete hysterectomy, no radiation or chemo    Gout     H/O diagnostic mammography 3/4/16    right breast, no evidence of malignancy    Hypertension     Liver disease 1961    hepatitis,     Liver disease     fatty liver       Past Surgical History:   Procedure Laterality Date    COLONOSCOPY N/A 8/12/2019    COL[facility] performed by Monica Tena MD at [address] [facility] HX COLONOSCOPY      HX ORTHOPAEDIC Left 2009    left knee replacement    HX ORTHOPAEDIC Right 2005    right shoulder partial replacement    HX OTHER SURGICAL      kiney stone removal       Social History     Socioeconomic History    Marital status:      Spouse name: Not on file    Number of children: Not on file    Years of education: Not on file    Highest education level: Not on file   Occupational History    Not on file   Social Needs    Financial resource strain: Not on file    Food insecurity     Worry: Not on file     Inability: Not on file    Transportation needs     Medical: Not on file     Non-medical: Not on file   Tobacco Use    Smoking status: Never Smoker    Smokeless tobacco: Never Used   Substance and Sexual Activity    Alcohol use:  No  Drug use: No    Sexual activity: Not Currently   Lifestyle    Physical activity     Days per week: Not on file     Minutes per session: Not on file    Stress: Not on file   Relationships    Social connections     Talks on phone: Not on file     Gets together: Not on file     Attends Gnosticist service: Not on file     Active member of club or organization: Not on file     Attends meetings of clubs or organizations: Not on file     Relationship status: Not on file    Intimate partner violence     Fear of current or ex partner: Not on file     Emotionally abused: Not on file     Physically abused: Not on file     Forced sexual activity: Not on file   Other Topics Concern    Not on file   Social History Narrative    Not on file       Family History   Problem Relation Age of Onset    Hypertension Other        Current Outpatient Medications on File Prior to Visit   Medication Sig Dispense Refill    valsartan (DIOVAN) 320 mg tablet       isosorbide mononitrate ER (IMDUR) 30 mg tablet       hydrALAZINE (APRESOLINE) 25 mg tablet 25 mg three (3) times daily. Indications: high blood pressure      cholecalciferol, vitamin D3, (VITAMIN D3 PO) Take  by mouth.  allopurinol (ZYLOPRIM) 100 mg tablet Take 100 mg by mouth.  metoprolol succinate (TOPROL XL) 100 mg XL tablet Take 150 mg by mouth daily.  aspirin 81 mg tablet Take 81 mg by mouth daily.  NITROSTAT 0.4 mg SL tablet 0.4 mg by SubLINGual route as needed. 5    benazepril (LOTENSIN) 40 mg tablet Take 40 mg by mouth daily. No current facility-administered medications on file prior to visit.         Allergies   Allergen Reactions    Benazepril Other (comments)     Throat swelliing    Pcn [Penicillins] Rash       ROS:   Review of Systems - History obtained from the patient  General ROS: negative for - chills, fatigue, fever, weight gain or weight loss  Psychological ROS: negative for - anxiety or depression  ENT ROS: negative for - headaches, sore throat or visual changes  Breast ROS: negative for breast lumps  Respiratory ROS: no cough, shortness of breath, or wheezing  Cardiovascular ROS: no chest pain or dyspnea on exertion  Gastrointestinal ROS: no abdominal pain, change in bowel habits, or black or bloody stools  Genito-Urinary ROS: no dysuria, trouble voiding, or hematuria  Musculoskeletal ROS: negative for - gait disturbance, joint pain, joint stiffness, muscle pain or muscular weakness  Neurological ROS: negative for - confusion, dizziness, headaches, memory loss, numbness/tingling, seizures, speech problems or weakness  Dermatological ROS: negative for - pruritus, rash or skin lesion changes      OBJECTIVE:  Physical Exam:  Visit Vitals  BP (!) 161/101 (BP 1 Location: Left lower arm, BP Patient Position: Sitting, BP Cuff Size: Adult)   Pulse 83   Ht 5' 10.5\" (1.791 m)   Wt 302 lb 3.2 oz (137.1 kg)   BMI 42.75 kg/m²      General: Alert and oriented. No acute distress. Well-nourished  HEENT: No thyroid enlargment. Neck supple without restrictions. Sclera normal. Normal occular motion. Moist mucous membranes. Lymphatics: No evidence of axillary, cervical, or subclavicular adenopathy. Respiratory: clear to auscultation and percussion to the bases. No CVAT. Cardiovascular: regular rate and rhythm. No murmurs, rubs, or gallops. Gastrointestinal: soft, non-tender, non-distended, no masses or organomegaly. Well-healed incision. Musculoskeletal: normal gait. No joint tenderness, deformity or swelling. No muscular tenderness. Extremities: extremities normal, atraumatic, no cyanosis or edema. Pelvic: exam chaperoned by nurse. Normal appearing external genitalia. On speculum exam, the vagina is atrophic. The uterus and cervix are surgically absent. No evidence of masses or nodularity on bimanual exam. Deferred rectovaginal exam.   Neuro: Grossly intact. Normal gait and movement.  No acute deficit  Skin: No evidence of rashes or skin changes. IMPRESSION AND PLAN:  Ms. Ely Escobar is a 69 y.o. female with a remote history of endometrial cancer s/p staging laparotomy in 1993. Patient Active Problem List    Diagnosis Date Noted    Obesity, morbid ([facility] [address] 01/16/2019    Obesity 07/22/2014    Hypercholesteremia 07/22/2014    Endometrial cancer ([facility] [address] 05/30/2012    Abdominal pain 04/12/2012    Unspecified essential hypertension 04/12/2012    Hypokalemia 04/12/2012     Reviewed patient's course to date. DEEPALI on exam today. Reassured patient. RTC in 1 year for continued surveillance. Reviewed precautionary symptoms to return sooner. All questions and concerns were addressed with the patient and she is comfortable with the plan. An electronic signature was used to authenticate this note.      Caprice Gómez MD

## 2022-03-19 PROBLEM — E66.01 OBESITY, MORBID (HCC): Status: ACTIVE | Noted: 2019-01-16

## 2022-05-16 NOTE — PROGRESS NOTES
One year follow up for endometrial cancer, pt reports no abnormal spotting or bleeding, pt states she has no questions or concerns for today's visit    1. Have you been to the ER, urgent care clinic since your last visit? Hospitalized since your last visit?  no    2. Have you seen or consulted any other health care providers outside of the [address] [place] [address] since your last visit? Include any pap smears or colon screening.    no

## 2022-05-18 ENCOUNTER — OFFICE VISIT (OUTPATIENT)
Dept: GYNECOLOGY | Age: 72
End: 2022-05-18
Payer: MEDICARE

## 2022-05-18 VITALS
WEIGHT: 287 LBS | HEIGHT: 71 IN | HEART RATE: 76 BPM | SYSTOLIC BLOOD PRESSURE: 166 MMHG | DIASTOLIC BLOOD PRESSURE: 93 MMHG | BODY MASS INDEX: 40.18 KG/M2

## 2022-05-18 DIAGNOSIS — C54.1 ENDOMETRIAL CANCER (HCC): Primary | ICD-10-CM

## 2022-05-18 PROCEDURE — G8432 DEP SCR NOT DOC, RNG: HCPCS | Performed by: OBSTETRICS & GYNECOLOGY

## 2022-05-18 PROCEDURE — G8536 NO DOC ELDER MAL SCRN: HCPCS | Performed by: OBSTETRICS & GYNECOLOGY

## 2022-05-18 PROCEDURE — 99213 OFFICE O/P EST LOW 20 MIN: CPT | Performed by: OBSTETRICS & GYNECOLOGY

## 2022-05-18 PROCEDURE — G8755 DIAS BP > OR = 90: HCPCS | Performed by: OBSTETRICS & GYNECOLOGY

## 2022-05-18 PROCEDURE — G8427 DOCREV CUR MEDS BY ELIG CLIN: HCPCS | Performed by: OBSTETRICS & GYNECOLOGY

## 2022-05-18 PROCEDURE — G8753 SYS BP > OR = 140: HCPCS | Performed by: OBSTETRICS & GYNECOLOGY

## 2022-05-18 PROCEDURE — 1101F PT FALLS ASSESS-DOCD LE1/YR: CPT | Performed by: OBSTETRICS & GYNECOLOGY

## 2022-05-18 PROCEDURE — 3017F COLORECTAL CA SCREEN DOC REV: CPT | Performed by: OBSTETRICS & GYNECOLOGY

## 2022-05-18 PROCEDURE — 1090F PRES/ABSN URINE INCON ASSESS: CPT | Performed by: OBSTETRICS & GYNECOLOGY

## 2022-05-18 PROCEDURE — G8400 PT W/DXA NO RESULTS DOC: HCPCS | Performed by: OBSTETRICS & GYNECOLOGY

## 2022-05-18 PROCEDURE — G0463 HOSPITAL OUTPT CLINIC VISIT: HCPCS | Performed by: OBSTETRICS & GYNECOLOGY

## 2022-05-18 PROCEDURE — G8417 CALC BMI ABV UP PARAM F/U: HCPCS | Performed by: OBSTETRICS & GYNECOLOGY

## 2022-05-18 RX ORDER — APIXABAN 5 MG/1
TABLET, FILM COATED ORAL
COMMUNITY
Start: 2022-05-04

## 2022-05-18 NOTE — PROGRESS NOTES
[address], [address], [address] [facility]  [phone] [facility] [phone]    Office Note    Patient ID:  Jesus Nam  [id number]  1950/71 y.o. Visit date: 5/18/2022    INTERVAL HISTORY: Jesus Nam is a 66 y.o.  female, an established patient,  with a remote history of endometrial cancer s/p staging laparotomy in 1993. Denies all complaints today. Denies vaginal bleeding/discharge, change in appetite or bowel habits, change in weight, abdominal/pelvic pain, or urinary symptoms.        OB/GYN ROS: Denies, dysuria, hematuria, urinary incontinence, vaginal discharge, abnormal vaginal bleeding, pelvic pain    Past Medical History:   Diagnosis Date    Angioedema     Cancer ([facility] [address] 1993    uterine, complete hysterectomy, no radiation or chemo    Gout     H/O diagnostic mammography 3/4/16    right breast, no evidence of malignancy    Hypertension     Liver disease 1961    hepatitis,     Liver disease     fatty liver       Past Surgical History:   Procedure Laterality Date    COLONOSCOPY N/A 8/12/2019    COLONOSCOPY performed by Jeremiah Barrow MD at [facility] HX COLONOSCOPY      HX ORTHOPAEDIC Left 2009    left knee replacement    HX ORTHOPAEDIC Right 2005    right shoulder partial replacement    HX OTHER SURGICAL      kiney stone removal       Social History     Socioeconomic History    Marital status:      Spouse name: Not on file    Number of children: Not on file    Years of education: Not on file    Highest education level: Not on file   Occupational History    Not on file   Tobacco Use    Smoking status: Never Smoker    Smokeless tobacco: Never Used   Substance and Sexual Activity    Alcohol use: No    Drug use: No    Sexual activity: Not Currently   Other Topics Concern    Not on file   Social History Narrative    Not on file     Social Determinants of Health     Financial Resource Strain:     Difficulty of Paying Living Expenses: Not on file   Food Insecurity:     Worried About Running Out of Food in the Last Year: Not on file    Richie of Food in the Last Year: Not on file   Transportation Needs:     Lack of Transportation (Medical): Not on file    Lack of Transportation (Non-Medical): Not on file   Physical Activity:     Days of Exercise per Week: Not on file    Minutes of Exercise per Session: Not on file   Stress:     Feeling of Stress : Not on file   Social Connections:     Frequency of Communication with Friends and Family: Not on file    Frequency of Social Gatherings with Friends and Family: Not on file    Attends Orthodox Services: Not on file    Active Member of [address] [place] [address] [employer] or Organizations: Not on file    Attends Club or Organization Meetings: Not on file    Marital Status: Not on file   Intimate Partner Violence:     Fear of Current or Ex-Partner: Not on file    Emotionally Abused: Not on file    Physically Abused: Not on file    Sexually Abused: Not on file   Housing Stability:     Unable to Pay for Housing in the Last Year: Not on file    Number of [place] in the Last Year: Not on file    Unstable Housing in the Last Year: Not on file       Family History   Problem Relation Age of Onset    Hypertension Other        Current Outpatient Medications on File Prior to Visit   Medication Sig Dispense Refill    valsartan (DIOVAN) 320 mg tablet       isosorbide mononitrate ER (IMDUR) 30 mg tablet       hydrALAZINE (APRESOLINE) 25 mg tablet 25 mg three (3) times daily. Indications: high blood pressure      cholecalciferol, vitamin D3, (VITAMIN D3 PO) Take  by mouth.  allopurinol (ZYLOPRIM) 100 mg tablet Take 100 mg by mouth.  metoprolol succinate (TOPROL XL) 100 mg XL tablet Take 150 mg by mouth daily.  aspirin 81 mg tablet Take 81 mg by mouth daily.         Eliquis 5 mg tablet       NITROSTAT 0.4 mg SL tablet 0.4 mg by SubLINGual route as needed. 5    benazepril (LOTENSIN) 40 mg tablet Take 40 mg by mouth daily. No current facility-administered medications on file prior to visit. Allergies   Allergen Reactions    Benazepril Other (comments)     Throat swelliing    Pcn [Penicillins] Rash       ROS:   Review of Systems - History obtained from the patient  General ROS: negative for - chills, fatigue, fever, weight gain or weight loss  Psychological ROS: negative for - anxiety or depression  ENT ROS: negative for - headaches, sore throat or visual changes  Breast ROS: negative for breast lumps  Respiratory ROS: no cough, shortness of breath, or wheezing  Cardiovascular ROS: no chest pain or dyspnea on exertion  Gastrointestinal ROS: no abdominal pain, change in bowel habits, or black or bloody stools  Genito-Urinary ROS: no dysuria, trouble voiding, or hematuria  Musculoskeletal ROS: negative for - gait disturbance, joint pain, joint stiffness, muscle pain or muscular weakness  Neurological ROS: negative for - confusion, dizziness, headaches, memory loss, numbness/tingling, seizures, speech problems or weakness  Dermatological ROS: negative for - pruritus, rash or skin lesion changes      OBJECTIVE:  Physical Exam:  Visit Vitals  BP (!) 166/93 (BP 1 Location: Left arm, BP Patient Position: Sitting)   Pulse 76   Ht 5' 10.5\" (1.791 m)   Wt 287 lb (130.2 kg)   BMI 40.60 kg/m²      General: Alert and oriented. No acute distress. Well-nourished  HEENT: No thyroid enlargment. Neck supple without restrictions. Sclera normal. Normal occular motion. Moist mucous membranes. Lymphatics: No evidence of axillary, cervical, or subclavicular adenopathy. Respiratory: clear to auscultation and percussion to the bases. No CVAT. Cardiovascular: regular rate and rhythm. No murmurs, rubs, or gallops. Gastrointestinal: soft, non-tender, non-distended, no masses or organomegaly. Well-healed incision. Musculoskeletal: normal gait.  No joint tenderness, deformity or swelling. No muscular tenderness. Extremities: extremities normal, atraumatic, no cyanosis or edema. Pelvic: exam chaperoned by nurse. Normal appearing external genitalia. On speculum exam, the vagina is atrophic. The uterus and cervix are surgically absent. No evidence of masses or nodularity on bimanual exam. Deferred rectovaginal exam.   Neuro: Grossly intact. Normal gait and movement. No acute deficit  Skin: No evidence of rashes or skin changes. IMPRESSION AND PLAN:  Ms. Shoshana Keller is a 66 y.o. female with a remote history of endometrial cancer s/p staging laparotomy in 1993. Patient Active Problem List    Diagnosis Date Noted    Obesity, morbid ([facility] [address] 01/16/2019    Obesity 07/22/2014    Hypercholesteremia 07/22/2014    Endometrial cancer ([facility] [address] 05/30/2012    Abdominal pain 04/12/2012    Unspecified essential hypertension 04/12/2012    Hypokalemia 04/12/2012     Reviewed patient's course to date. DEEPALI on exam today. Reassured patient. RTC in 1 year for continued surveillance. Reviewed precautionary symptoms to return sooner. All questions and concerns were addressed with the patient and she is comfortable with the plan. An electronic signature was used to authenticate this note.      Sai Schumacher MD

## 2023-05-24 ENCOUNTER — OFFICE VISIT (OUTPATIENT)
Age: 73
End: 2023-05-24
Payer: MEDICARE

## 2023-05-24 VITALS
SYSTOLIC BLOOD PRESSURE: 152 MMHG | WEIGHT: 293 LBS | BODY MASS INDEX: 41.95 KG/M2 | HEIGHT: 70 IN | DIASTOLIC BLOOD PRESSURE: 96 MMHG | HEART RATE: 73 BPM

## 2023-05-24 DIAGNOSIS — C54.1 ENDOMETRIAL CANCER (HCC): Primary | ICD-10-CM

## 2023-05-24 PROCEDURE — 1123F ACP DISCUSS/DSCN MKR DOCD: CPT | Performed by: OBSTETRICS & GYNECOLOGY

## 2023-05-24 PROCEDURE — 1036F TOBACCO NON-USER: CPT | Performed by: OBSTETRICS & GYNECOLOGY

## 2023-05-24 PROCEDURE — 99213 OFFICE O/P EST LOW 20 MIN: CPT | Performed by: OBSTETRICS & GYNECOLOGY

## 2023-05-24 PROCEDURE — 3017F COLORECTAL CA SCREEN DOC REV: CPT | Performed by: OBSTETRICS & GYNECOLOGY

## 2023-05-24 PROCEDURE — G8419 CALC BMI OUT NRM PARAM NOF/U: HCPCS | Performed by: OBSTETRICS & GYNECOLOGY

## 2023-05-24 PROCEDURE — G8427 DOCREV CUR MEDS BY ELIG CLIN: HCPCS | Performed by: OBSTETRICS & GYNECOLOGY

## 2023-05-24 PROCEDURE — 1090F PRES/ABSN URINE INCON ASSESS: CPT | Performed by: OBSTETRICS & GYNECOLOGY

## 2023-05-24 PROCEDURE — G8400 PT W/DXA NO RESULTS DOC: HCPCS | Performed by: OBSTETRICS & GYNECOLOGY

## 2023-05-24 NOTE — PROGRESS NOTES
[facility] [address], [address] [facility]  [phone] [facility] [phone]    Office Note    Patient ID:  Daily Arriola  [id number]  1950/71 y.o. Visit date: 5/18/2022    INTERVAL HISTORY:   Ms. Daily Arriola is a 67 y.o.  female, an established patient,  with a remote history of endometrial cancer s/p staging laparotomy in 1993. Denies all complaints today. Denies vaginal bleeding/discharge, change in appetite or bowel habits, change in weight, abdominal/pelvic pain, or urinary symptoms.        OB/GYN ROS: Denies, dysuria, hematuria, urinary incontinence, vaginal discharge, abnormal vaginal bleeding, pelvic pain    Patient Active Problem List    Diagnosis Date Noted    Obesity, morbid ([facility] [address] 01/16/2019    Obesity 07/22/2014    Hypercholesteremia 07/22/2014    Endometrial cancer ([facility] [address] 05/30/2012    Abdominal pain 04/12/2012    Hypokalemia 04/12/2012       Past Medical History:   Diagnosis Date    Angioedema     Cancer ([facility] [address] 1993    uterine, complete hysterectomy, no radiation or chemo    Gout     H/O diagnostic mammography 3/4/16    right breast, no evidence of malignancy    Hypertension     Liver disease 1961    hepatitis,     Liver disease     fatty liver       Past Surgical History:   Procedure Laterality Date    COLONOSCOPY      COLONOSCOPY N/A 8/12/2019    COLONOSCOPY performed by Ronnie Echols MD at [place] [address] Right 2005    right shoulder partial replacement    ORTHOPEDIC SURGERY Left 2009    left knee replacement    OTHER SURGICAL HISTORY      kiney stone removal       Family History   Problem Relation Age of Onset    Hypertension Other        Social History     Socioeconomic History    Marital status:      Spouse name: None    Number of children: None    Years of education: None    Highest education level: None   Tobacco Use    Smoking status: Never    Smokeless tobacco: Never   Substance and Sexual

## 2023-05-24 NOTE — PROGRESS NOTES
One year follow up for endometrial cancer, pt reports no abnormal spotting or bleeding, pt states she has no questions or concerns for today's visit    1. Have you been to the ER, urgent care clinic since your last visit? Hospitalized since your last visit?  no    2. Have you seen or consulted any other health care providers outside of the [address] [place] [address] since your last visit? Include any pap smears or colon screening.    no

## 2024-07-27 ENCOUNTER — HOSPITAL ENCOUNTER (EMERGENCY)
Facility: HOSPITAL | Age: 74
Discharge: HOME | End: 2024-07-27
Attending: EMERGENCY MEDICINE
Payer: MEDICARE

## 2024-07-27 ENCOUNTER — APPOINTMENT (OUTPATIENT)
Facility: HOSPITAL | Age: 74
End: 2024-07-27
Payer: MEDICARE

## 2024-07-27 VITALS
RESPIRATION RATE: 17 BRPM | BODY MASS INDEX: 41.95 KG/M2 | SYSTOLIC BLOOD PRESSURE: 122 MMHG | HEART RATE: 72 BPM | HEIGHT: 70 IN | WEIGHT: 293 LBS | OXYGEN SATURATION: 96 % | DIASTOLIC BLOOD PRESSURE: 77 MMHG | TEMPERATURE: 98.7 F

## 2024-07-27 DIAGNOSIS — M54.12 CERVICAL RADICULOPATHY: Primary | ICD-10-CM

## 2024-07-27 PROCEDURE — 73030 X-RAY EXAM OF SHOULDER: CPT

## 2024-07-27 PROCEDURE — 72125 CT NECK SPINE W/O DYE: CPT

## 2024-07-27 PROCEDURE — 99284 EMERGENCY DEPT VISIT MOD MDM: CPT

## 2024-07-27 RX ORDER — CYCLOBENZAPRINE HCL 10 MG
10 TABLET ORAL 3 TIMES DAILY PRN
Qty: 21 TABLET | Refills: 0 | Status: SHIPPED | OUTPATIENT
Start: 2024-07-27 | End: 2024-08-06

## 2024-07-27 ASSESSMENT — PAIN - FUNCTIONAL ASSESSMENT: PAIN_FUNCTIONAL_ASSESSMENT: 0-10

## 2024-07-27 ASSESSMENT — PAIN DESCRIPTION - LOCATION: LOCATION: SHOULDER

## 2024-07-27 ASSESSMENT — PAIN SCALES - GENERAL: PAINLEVEL_OUTOF10: 6

## 2024-07-27 NOTE — DISCHARGE INSTRUCTIONS
Return with any new or worsening symptoms.  Use the muscle relaxers and Tylenol as needed for pain.  If needed you can use the Medrol Dosepak that you have at home.  I have also provided you information to follow-up with orthopedics as well if needed

## 2024-07-27 NOTE — ED TRIAGE NOTES
Pt reports to ED w/ cc of left shoulder pain that radiates down her arm. Pt said on Thursday she moved a heavy cabinet w/ her     Pt states when she rubs her shoulder the pain goes away. Denies chest pain, SOB, headache. Pt said she feels like she may have slept wrong.

## 2024-07-27 NOTE — ED PROVIDER NOTES
[facility] EMERGENCY DEPT  EMERGENCY DEPARTMENT ENCOUNTER      Pt Name: Nikia De Jesus  MRN: [id number]  Birthdate 1950  Date of evaluation: 7/27/2024  Provider: Sanket Fuchs MD      HISTORY OF PRESENT ILLNESS      HPI        Nursing Notes were reviewed.    REVIEW OF SYSTEMS         Review of Systems        PAST MEDICAL HISTORY     Past Medical History:   Diagnosis Date    Angioedema     Cancer ([facility]) 1993    uterine, complete hysterectomy, no radiation or chemo    Gout     H/O diagnostic mammography 3/4/16    right breast, no evidence of malignancy    Hypertension     Liver disease 1961    hepatitis,     Liver disease     fatty liver         SURGICAL HISTORY       Past Surgical History:   Procedure Laterality Date    COLONOSCOPY      COLONOSCOPY N/A 8/12/2019    COLONOSCOPY performed by Adelso Rivera MD at [facility] ENDOSCOPY    ORTHOPEDIC SURGERY Right 2005    right shoulder partial replacement    ORTHOPEDIC SURGERY Left 2009    left knee replacement    OTHER SURGICAL HISTORY      kiney stone removal         CURRENT MEDICATIONS       Previous Medications    ALLOPURINOL (ZYLOPRIM) 100 MG TABLET    Take 100 mg by mouth    APIXABAN (ELIQUIS) 5 MG TABS TABLET    ceived the following from [facility]: Outside name: Eliquis 5 mg tablet    ASPIRIN 81 MG CAPS    Take 81 mg by mouth daily    BENAZEPRIL (LOTENSIN) 40 MG TABLET    Take 40 mg by mouth daily    HYDRALAZINE (APRESOLINE) 25 MG TABLET    1 tablet 3 times daily    ISOSORBIDE MONONITRATE (IMDUR) 30 MG EXTENDED RELEASE TABLET    ceived the following from [facility]: Outside name: isosorbide mononitrate ER (IMDUR) 30 mg tablet    METOPROLOL SUCCINATE (TOPROL XL) 100 MG EXTENDED RELEASE TABLET    Take 1.5 tablets by mouth daily    NITROGLYCERIN (NITROSTAT) 0.4 MG SL TABLET    Place 0.4 mg under the tongue as needed    VALSARTAN (DIOVAN) 320 MG TABLET    ceived the following from [facility]: Outside name:

## 2024-07-30 ENCOUNTER — OFFICE VISIT (OUTPATIENT)
Age: 74
End: 2024-07-30
Payer: MEDICARE

## 2024-07-30 VITALS
HEIGHT: 70 IN | WEIGHT: 293 LBS | SYSTOLIC BLOOD PRESSURE: 150 MMHG | DIASTOLIC BLOOD PRESSURE: 85 MMHG | HEART RATE: 71 BPM | BODY MASS INDEX: 41.95 KG/M2

## 2024-07-30 DIAGNOSIS — C54.1 ENDOMETRIAL CANCER (HCC): Primary | ICD-10-CM

## 2024-07-30 PROCEDURE — 1123F ACP DISCUSS/DSCN MKR DOCD: CPT | Performed by: OBSTETRICS & GYNECOLOGY

## 2024-07-30 PROCEDURE — G8427 DOCREV CUR MEDS BY ELIG CLIN: HCPCS | Performed by: OBSTETRICS & GYNECOLOGY

## 2024-07-30 PROCEDURE — G8417 CALC BMI ABV UP PARAM F/U: HCPCS | Performed by: OBSTETRICS & GYNECOLOGY

## 2024-07-30 PROCEDURE — G8400 PT W/DXA NO RESULTS DOC: HCPCS | Performed by: OBSTETRICS & GYNECOLOGY

## 2024-07-30 PROCEDURE — 3017F COLORECTAL CA SCREEN DOC REV: CPT | Performed by: OBSTETRICS & GYNECOLOGY

## 2024-07-30 PROCEDURE — 1090F PRES/ABSN URINE INCON ASSESS: CPT | Performed by: OBSTETRICS & GYNECOLOGY

## 2024-07-30 PROCEDURE — 1036F TOBACCO NON-USER: CPT | Performed by: OBSTETRICS & GYNECOLOGY

## 2024-07-30 PROCEDURE — 99212 OFFICE O/P EST SF 10 MIN: CPT | Performed by: OBSTETRICS & GYNECOLOGY

## 2024-07-30 NOTE — PROGRESS NOTES
One year follow up for endometrial cancer, pt reports no abnormal spotting or bleeding, pt states she has no questions or concerns for today's visit    1. Have you been to the ER, urgent care clinic since your last visit?  Hospitalized since your last visit?  Yes to ER on 7/27/2024 for shoulder pain    2. Have you seen or consulted any other health care providers outside of the [facility] since your last visit?  Include any pap smears or colon screening.   no            
dyspnea on exertion  Gastrointestinal ROS: no abdominal pain, change in bowel habits, or black or bloody stools  Genito-Urinary ROS: no dysuria, trouble voiding, or hematuria  Musculoskeletal ROS: negative for - gait disturbance, joint pain, joint stiffness, muscle pain or muscular weakness  Neurological ROS: negative for - confusion, dizziness, headaches, memory loss, numbness/tingling, seizures, speech problems or weakness  Dermatological ROS: negative for - pruritus, rash or skin lesion changes      OBJECTIVE:  Physical Exam:  Vitals:    07/30/24 1018   BP: (!) 150/85   Site: Left Upper Arm   Position: Sitting   Pulse: 71   Weight: 136 kg (299 lb 13.2 oz)   Height: 1.778 m (5' 10\")       General: Alert and oriented. No acute distress. Well-nourished  HEENT: No thyroid enlargment. Neck supple without restrictions. Sclera normal. Normal occular motion. Moist mucous membranes.  Lymphatics: No evidence of axillary, cervical, or subclavicular adenopathy.   Respiratory: clear to auscultation and percussion to the bases. No CVAT.  Cardiovascular: regular rate and rhythm. No murmurs, rubs, or gallops.  Gastrointestinal: soft, non-tender, non-distended, no masses or organomegaly. Well-healed incision.   Musculoskeletal: normal gait. No joint tenderness, deformity or swelling. No muscular tenderness.   Extremities: extremities normal, atraumatic, no cyanosis or edema.  Pelvic: exam chaperoned by nurse. Normal appearing external genitalia. On speculum exam, the vagina is atrophic. The uterus and cervix are surgically absent. No evidence of masses or nodularity on bimanual exam. Deferred rectovaginal exam.   Neuro: Grossly intact. Normal gait and movement. No acute deficit  Skin: No evidence of rashes or skin changes.        IMPRESSION AND PLAN:  Ms. Nikia De Jesus is a 73 y.o.  female with a remote history of endometrial cancer s/p staging laparotomy in 1993.     Patient Active Problem List    Diagnosis Date Noted

## (undated) DEVICE — KIT COLON W/ 1.1OZ LUB AND 2 END

## (undated) DEVICE — KENDALL RADIOLUCENT FOAM MONITORING ELECTRODE -RECTANGULAR SHAPE: Brand: KENDALL

## (undated) DEVICE — SET GRAV CK VLV NEEDLESS ST 3 GANGED 4WAY STPCOCK HI FLO 10

## (undated) DEVICE — Device

## (undated) DEVICE — SET ADMIN 16ML TBNG L100IN 2 Y INJ SITE IV PIGGY BK DISP

## (undated) DEVICE — SOLIDIFIER MEDC 1200ML -- CONVERT TO 356117

## (undated) DEVICE — 3M™ CUROS™ DISINFECTING CAP FOR NEEDLELESS CONNECTORS 270/CARTON 20 CARTONS/CASE CFF1-270: Brand: CUROS™

## (undated) DEVICE — CATH IV AUTOGRD BC PNK 20GA 25 -- INSYTE

## (undated) DEVICE — 1200 GUARD II KIT W/5MM TUBE W/O VAC TUBE: Brand: GUARDIAN

## (undated) DEVICE — BAG BELONG PT PERS CLEAR HANDL

## (undated) DEVICE — SIMPLICITY FLUFF UNDERPAD 23X36, MODERATE: Brand: SIMPLICITY

## (undated) DEVICE — ADULT SPO2 SENSOR: Brand: NELLCOR

## (undated) DEVICE — CANN NASAL O2 CAPNOGRAPHY AD -- FILTERLINE